# Patient Record
Sex: MALE | Race: WHITE
[De-identification: names, ages, dates, MRNs, and addresses within clinical notes are randomized per-mention and may not be internally consistent; named-entity substitution may affect disease eponyms.]

---

## 2020-10-20 ENCOUNTER — HOSPITAL ENCOUNTER (OUTPATIENT)
Dept: HOSPITAL 35 - LAB | Age: 75
End: 2020-10-20
Attending: SURGERY
Payer: COMMERCIAL

## 2020-10-20 DIAGNOSIS — Z01.812: Primary | ICD-10-CM

## 2020-10-20 DIAGNOSIS — Z20.828: ICD-10-CM

## 2020-10-23 ENCOUNTER — HOSPITAL ENCOUNTER (OUTPATIENT)
Dept: HOSPITAL 35 - OR | Age: 75
Discharge: HOME | End: 2020-10-23
Attending: SURGERY
Payer: COMMERCIAL

## 2020-10-23 VITALS — DIASTOLIC BLOOD PRESSURE: 59 MMHG | SYSTOLIC BLOOD PRESSURE: 152 MMHG

## 2020-10-23 VITALS — WEIGHT: 157 LBS | HEIGHT: 67 IN | BODY MASS INDEX: 24.64 KG/M2

## 2020-10-23 DIAGNOSIS — I10: ICD-10-CM

## 2020-10-23 DIAGNOSIS — Z98.890: ICD-10-CM

## 2020-10-23 DIAGNOSIS — Z79.899: ICD-10-CM

## 2020-10-23 DIAGNOSIS — Z90.49: ICD-10-CM

## 2020-10-23 DIAGNOSIS — K40.90: Primary | ICD-10-CM

## 2020-10-23 PROCEDURE — 70005: CPT

## 2020-10-23 PROCEDURE — 50101: CPT

## 2020-10-23 PROCEDURE — 62850: CPT

## 2020-10-23 PROCEDURE — 50010 RENAL EXPLORATION: CPT

## 2020-10-23 PROCEDURE — 62110: CPT

## 2020-10-23 NOTE — EKG
[facility]
Jose Szymanski
[place]   [address]                     ELECTROCARDIOGRAM REPORT      
_______________________________________________________________________________
 
Name:       TAE RIOS             Room #:                     REG [facility].#:      [id number]                       Account #:      [id number]  
Admission:  10/23/20    Attend Phys:    Chuck Chapa MD  
Discharge:              Date of Birth:  [birth year]  
                                                          Report #: [id number]
                                                                    [id number]
_______________________________________________________________________________
THIS REPORT FOR:  
 
cc:  Taz Moses MD, Steven E. MD Couchonnal, Luis F. MD                                            ~
THIS REPORT FOR:   //name//                          
 
                          [facility]
                                       
Test Date:    2020-10-23               Test Time:    12:27:49
Pat Name:     TAE RIOS          Department:   
Patient ID:   [id number]            Room:          
Gender:                               Technician:   SHEEBA
:          1945               Requested By: Chuck Chapa
Order Number: [id number] MD:   Siva Reed
                                 Measurements
Intervals                              Axis          
Rate:         73                       P:            3
MD:           197                      QRS:          -37
QRSD:         79                       T:            -40
QT:           451                                    
QTc:          497                                    
                           Interpretive Statements
Sinus bradycardia
Left atrial enlargement
RSR' in V1 or V2, right VCD or RVH
Left ventricular hypertrophy
No previous ECG available for comparison
Electronically Signed On 10- 12:36:06 CDT by Siva Reed
[email or web address]
 
 
 
 
 
 
 
 
 
 
 
 
 
 
  <ELECTRONICALLY SIGNED>
   By: Siva Reed MD        
  10/23/20     1236
D: 10/23/20 1227                           _____________________________________
T: 10/23/20 1227                           Siva Reed MD          /EPI

## 2020-10-23 NOTE — O
[facility]
Jose Szymanski
[place]   [address]                     OPERATIVE REPORT              
_______________________________________________________________________________
 
Name:       TAE RIOS             Room #:                     REG [facility]#:      [id number]                       Account #:      [id number]  
Admission:  10/23/20    Attend Phys:    Chuck Chapa MD  
Discharge:              Date of Birth:  [birth year]  
                                                          Report #: [id number]
                                                                    [id number]   
_______________________________________________________________________________
THIS REPORT FOR:  
 
cc:  Taz Moses MD, Steven E. MD Franey,Chuck JUNE MD                                          ~
CC: [facility]
    TAZ Chapa
 
DATE OF SERVICE:  10/23/2020
 
 
PATIENT OF:  Taz Moses MD.
 
PREOPERATIVE DIAGNOSIS:  Left inguinal hernia.
 
POSTOPERATIVE DIAGNOSIS:  Left inguinal hernia with a left cord lipoma.
 
PROCEDURES:  Left inguinal hernia repair with Prolene hernia system mesh and
excision of a left cord lipoma.
 
SURGEON:  Chuck Chapa MD.
 
ANESTHESIA:  Local with IV sedation.
 
DESCRIPTION OF PROCEDURE:  The patient was brought to the operating room and
placed on operative table in the supine position.  Sequential compression
devices were in place for DVT prophylaxis.  There was no indication for
preoperative antibiotics.  The patient underwent IV sedation.  Left inguinal
area was prepped and draped in a sterile fashion.  Skin and subcutaneous tissues
were then infiltrated with 0.5% Marcaine and 1% Xylocaine in a 1:1 mixture. 
Left inguinal skin incision was then performed using #10 scalpel blade. 
Hemostasis was obtained using electrocautery as well as clamps and 2-0 chromic
ties.  Dissection was carried down through subcutaneous tissue to the external
oblique fascia, which was then incised with a knife and opened with Metzenbaum
scissors.  The ilioinguinal nerve was identified, dissected free, injected with
a local mixture and preserved.  Cord was then elevated and held in place with a
Penrose drain.  Cremasteric muscle fibers were then split in the direction of
their fibers using clamp and electrocautery.  A cord lipoma was identified,
dissected free, clamped, excised and tied with a 2-0 chromic tie.  Floor was
inspected and there was a moderate-to-large sized direct inguinal hernia defect.
 This hernia sac was dissected free, opened and the hernia sac was reduced back
into the preperitoneal space.  An indirect inguinal hernia sac was then also
identified, dissected free and reduced back into the preperitoneal space at the
internal ring.  The preperitoneal space was developed from the internal ring to
 
 
 
[address]   [address]                     OPERATIVE REPORT              
_______________________________________________________________________________
 
Name:       TAE RIOS             [id number]                       Account #:      [id number]  
Admission:  10/23/20    Attend Phys:    Chuck Chapa MD  
Discharge:              Date of Birth:  [birth year]  
                                                          Report #: [id number]
                                                                    [id number]   
_______________________________________________________________________________
the pubic tubercle and an extended Prolene hernia system mesh was then inserted
through the internal ring and the underlay patch was then deployed into the
preperitoneal space extending from the internal ring to the pubic tubercle.  The
floor was then closed over the mesh using a running 2-0 Prolene 2-layer
Shouldice repair.  The overlay patch was then deployed into the inguinal canal
and secured to the pubic tubercle with the same running 2-0 Prolene suture.  The
mesh was then secured superiorly and at the connector using simple interrupted
2-0 Vicryl sutures.  The mesh was split and wrapped around the cord, secured to
the inguinal ligament with simple interrupted 2-0 Vicryl suture.  The cord and
ilioinguinal nerve were then returned to the canal intact.  The external oblique
fascia was then closed using running 2-0 Vicryl suture.  Margot's fascia was
then reapproximated using 3 simple interrupted 2-0 chromic sutures and the skin
then closed with a running 4-0 subcuticular Vicryl stitch.  Wound was then
dressed with Mastisol, 1/2-inch Steri-Strips cut in half, Telfa, 4 x 4 gauze,
sponge and tape.  The patient was then awakened from the IV sedation, taken to
recovery room in good condition.  Estimated blood loss was approximately 10 mL
and the patient tolerated procedure well.  All sponge, lap and instrument counts
correct x 2.
 
 
 
 
 
 
 
 
 
 
 
 
 
 
 
 
 
 
 
 
 
 
 
 
 
 
  <ELECTRONICALLY SIGNED>
   By: Chuck Chapa MD          
  10/23/20     1649
D: 10/23/20 1542                           _____________________________________
T: 10/23/20 1617                           Chuck Chapa MD            /nt

## 2020-10-27 NOTE — PATH
[facility]
[address]
[place]   [address]                     PATHOLOGY RPT PROCEDURE       
_______________________________________________________________________________
 
Name:       LASHAE RIOSICK S             Room #:                     REG [facility].#:      [id number]     Account #:      [id number]  
Admission:  10/23/20    Date of Birth:  [birth year]  
Discharge:                                              Report #:    [id number]
                                                        Path Case #: [id number]
_______________________________________________________________________________
 
[facility] Accession Number: [id number]
.                                                                01
Material submitted:                                        .
hernia - CORD LIPOMA
.                                                                01
Clinical history:                                          .
HERNIA REPAIR, INGUINAL UNILATERAL
.                                                                02
**********************************************************************
Diagnosis:
Mature adipose tissue, cord lipoma, resection:
- Fragments compatible with a lipoma.
- Fibrovascular connective tissue along with reactive skeletal muscle
tissue.
(IUV:li; 10/27/2020)
QMS  10/27/2020  1437 Local
**********************************************************************
.                                                                02
Electronically signed:                                     .
Caro Pierce MD, Pathologist
NPI- [id number]
.                                                                01
Gross description:                                         .
The specimen is received in formalin, labeled "Abhijit, Carlitos, cord
lipoma" and consists of multiple segments of membranous tan-brown tissue
and yellow lobulated tissue measuring 5.5 x 4.8 x 1.4 cm.  Sectioning
reveals no gross lesions and representative tissue submitted in A1.
(SDY; 10/26/2020)
SYU/SYU  10/26/2020  1633 Local
.                                                                02
Pathologist provided ICD-10:
K40.90
.                                                                02
CPT                                                        .
347811
Specimen Comment: A courtesy copy of this report has been sent to [phone]
[phone]
Specimen Comment: [phone]
Specimen Comment: Report sent to  / DR SMALL
***Performed at:  01
   [address], [place], [place]  [id number]
   MD Sathya Desai MD Phone:  [phone]
***Performed at:  02
   [address]  [id number]
   MD Caro Pierce MD Phone:  [phone]